# Patient Record
Sex: MALE | Race: OTHER
[De-identification: names, ages, dates, MRNs, and addresses within clinical notes are randomized per-mention and may not be internally consistent; named-entity substitution may affect disease eponyms.]

---

## 2022-10-22 ENCOUNTER — HOSPITAL ENCOUNTER (INPATIENT)
Dept: HOSPITAL 54 - ER | Age: 87
LOS: 4 days | Discharge: NURSING, REHAB OR LONG-TERM CARE | DRG: 280 | End: 2022-10-26
Attending: INTERNAL MEDICINE | Admitting: NURSE PRACTITIONER
Payer: MEDICARE

## 2022-10-22 VITALS — WEIGHT: 129.38 LBS | HEIGHT: 68 IN | BODY MASS INDEX: 19.61 KG/M2

## 2022-10-22 DIAGNOSIS — N17.0: ICD-10-CM

## 2022-10-22 DIAGNOSIS — Z79.01: ICD-10-CM

## 2022-10-22 DIAGNOSIS — E78.5: ICD-10-CM

## 2022-10-22 DIAGNOSIS — I21.A1: ICD-10-CM

## 2022-10-22 DIAGNOSIS — K80.20: ICD-10-CM

## 2022-10-22 DIAGNOSIS — J90: ICD-10-CM

## 2022-10-22 DIAGNOSIS — I50.33: ICD-10-CM

## 2022-10-22 DIAGNOSIS — I11.0: Primary | ICD-10-CM

## 2022-10-22 DIAGNOSIS — I48.91: ICD-10-CM

## 2022-10-22 DIAGNOSIS — Z86.16: ICD-10-CM

## 2022-10-22 DIAGNOSIS — J98.11: ICD-10-CM

## 2022-10-22 DIAGNOSIS — Z95.0: ICD-10-CM

## 2022-10-22 DIAGNOSIS — Z79.02: ICD-10-CM

## 2022-10-22 DIAGNOSIS — Z79.899: ICD-10-CM

## 2022-10-22 DIAGNOSIS — F01.50: ICD-10-CM

## 2022-10-22 DIAGNOSIS — E88.09: ICD-10-CM

## 2022-10-22 DIAGNOSIS — F20.9: ICD-10-CM

## 2022-10-22 DIAGNOSIS — I48.92: ICD-10-CM

## 2022-10-22 DIAGNOSIS — N28.1: ICD-10-CM

## 2022-10-22 DIAGNOSIS — E80.6: ICD-10-CM

## 2022-10-22 LAB
BASOPHILS # BLD AUTO: 0 K/UL (ref 0–0.2)
BASOPHILS NFR BLD AUTO: 0.4 % (ref 0–2)
BILIRUB UR QL STRIP: (no result)
BUN SERPL-MCNC: 33 MG/DL (ref 7–18)
CALCIUM SERPL-MCNC: 8.3 MG/DL (ref 8.5–10.1)
CHLORIDE SERPL-SCNC: 109 MMOL/L (ref 98–107)
CO2 SERPL-SCNC: 30 MMOL/L (ref 21–32)
COLOR UR: (no result)
CREAT SERPL-MCNC: 1.3 MG/DL (ref 0.6–1.3)
EOSINOPHIL NFR BLD AUTO: 0.1 % (ref 0–6)
GLUCOSE SERPL-MCNC: 133 MG/DL (ref 74–106)
GLUCOSE UR STRIP-MCNC: NEGATIVE MG/DL
HCT VFR BLD AUTO: 42 % (ref 39–51)
HGB BLD-MCNC: 12.6 G/DL (ref 13.5–17.5)
LEUKOCYTE ESTERASE UR QL STRIP: NEGATIVE
LYMPHOCYTES NFR BLD AUTO: 1.5 K/UL (ref 0.8–4.8)
LYMPHOCYTES NFR BLD AUTO: 12.9 % (ref 20–44)
MCHC RBC AUTO-ENTMCNC: 30 G/DL (ref 31–36)
MCV RBC AUTO: 91 FL (ref 80–96)
MONOCYTES NFR BLD AUTO: 1.3 K/UL (ref 0.1–1.3)
MONOCYTES NFR BLD AUTO: 11.1 % (ref 2–12)
NEUTROPHILS # BLD AUTO: 9 K/UL (ref 1.8–8.9)
NEUTROPHILS NFR BLD AUTO: 75.5 % (ref 43–81)
NITRITE UR QL STRIP: NEGATIVE
PH UR STRIP: 6 [PH] (ref 5–8)
PLATELET # BLD AUTO: 165 K/UL (ref 150–450)
POTASSIUM SERPL-SCNC: 4.5 MMOL/L (ref 3.5–5.1)
PROT UR QL STRIP: (no result) MG/DL
RBC # BLD AUTO: 4.62 MIL/UL (ref 4.5–6)
SODIUM SERPL-SCNC: 145 MMOL/L (ref 136–145)
UROBILINOGEN UR STRIP-MCNC: >=8 EU/DL
WBC NRBC COR # BLD AUTO: 11.9 K/UL (ref 4.3–11)

## 2022-10-22 PROCEDURE — C9803 HOPD COVID-19 SPEC COLLECT: HCPCS

## 2022-10-22 PROCEDURE — G0378 HOSPITAL OBSERVATION PER HR: HCPCS

## 2022-10-22 NOTE — NUR
-------------------------------------------------------------------------------

           *** Note undone in [facility] - 10/23/22 at 0533 by LASHAWN ***            

-------------------------------------------------------------------------------

-1

## 2022-10-23 VITALS — DIASTOLIC BLOOD PRESSURE: 67 MMHG | SYSTOLIC BLOOD PRESSURE: 131 MMHG

## 2022-10-23 VITALS — DIASTOLIC BLOOD PRESSURE: 72 MMHG | SYSTOLIC BLOOD PRESSURE: 110 MMHG

## 2022-10-23 LAB
ALBUMIN SERPL BCP-MCNC: 2.1 G/DL (ref 3.4–5)
ALP SERPL-CCNC: 134 U/L (ref 46–116)
ALT SERPL W P-5'-P-CCNC: 37 U/L (ref 12–78)
AST SERPL W P-5'-P-CCNC: 61 U/L (ref 15–37)
BASOPHILS # BLD AUTO: 0 K/UL (ref 0–0.2)
BASOPHILS NFR BLD AUTO: 0 % (ref 0–2)
BILIRUB SERPL-MCNC: 6.7 MG/DL (ref 0.2–1)
BUN SERPL-MCNC: 34 MG/DL (ref 7–18)
CALCIUM SERPL-MCNC: 8.1 MG/DL (ref 8.5–10.1)
CHLORIDE SERPL-SCNC: 107 MMOL/L (ref 98–107)
CO2 SERPL-SCNC: 25 MMOL/L (ref 21–32)
CREAT SERPL-MCNC: 1.3 MG/DL (ref 0.6–1.3)
EOSINOPHIL NFR BLD AUTO: 0 % (ref 0–6)
GLUCOSE SERPL-MCNC: 111 MG/DL (ref 74–106)
HCT VFR BLD AUTO: 42 % (ref 39–51)
HGB BLD-MCNC: 13 G/DL (ref 13.5–17.5)
LYMPHOCYTES NFR BLD AUTO: 0.8 K/UL (ref 0.8–4.8)
LYMPHOCYTES NFR BLD AUTO: 10.1 % (ref 20–44)
MCHC RBC AUTO-ENTMCNC: 31 G/DL (ref 31–36)
MCV RBC AUTO: 91 FL (ref 80–96)
MONOCYTES NFR BLD AUTO: 0.4 K/UL (ref 0.1–1.3)
MONOCYTES NFR BLD AUTO: 4.5 % (ref 2–12)
NEUTROPHILS # BLD AUTO: 6.9 K/UL (ref 1.8–8.9)
NEUTROPHILS NFR BLD AUTO: 85.4 % (ref 43–81)
PLATELET # BLD AUTO: 160 K/UL (ref 150–450)
PROT SERPL-MCNC: 7.9 G/DL (ref 6.4–8.2)
RBC # BLD AUTO: 4.65 MIL/UL (ref 4.5–6)
SODIUM SERPL-SCNC: 142 MMOL/L (ref 136–145)
WBC NRBC COR # BLD AUTO: 8.1 K/UL (ref 4.3–11)

## 2022-10-23 RX ADMIN — APIXABAN SCH MG: 5 TABLET, FILM COATED ORAL at 09:58

## 2022-10-23 RX ADMIN — RANOLAZINE SCH MG: 500 TABLET, FILM COATED, EXTENDED RELEASE ORAL at 09:57

## 2022-10-23 RX ADMIN — QUETIAPINE SCH MG: 25 TABLET, FILM COATED ORAL at 11:45

## 2022-10-23 RX ADMIN — Medication SCH MG: at 21:45

## 2022-10-23 RX ADMIN — MEMANTINE HYDROCHLORIDE SCH MG: 5 TABLET ORAL at 09:57

## 2022-10-23 RX ADMIN — RANOLAZINE SCH MG: 500 TABLET, FILM COATED, EXTENDED RELEASE ORAL at 21:45

## 2022-10-23 RX ADMIN — PANTOPRAZOLE SODIUM SCH MG: 40 TABLET, DELAYED RELEASE ORAL at 08:00

## 2022-10-23 RX ADMIN — APIXABAN SCH MG: 5 TABLET, FILM COATED ORAL at 16:37

## 2022-10-23 RX ADMIN — DONEPEZIL HYDROCHLORIDE SCH MG: 5 TABLET ORAL at 21:45

## 2022-10-23 RX ADMIN — CLOPIDOGREL BISULFATE SCH MG: 75 TABLET, FILM COATED ORAL at 09:57

## 2022-10-23 RX ADMIN — MEMANTINE HYDROCHLORIDE SCH MG: 5 TABLET ORAL at 21:46

## 2022-10-23 RX ADMIN — QUETIAPINE SCH MG: 25 TABLET, FILM COATED ORAL at 16:37

## 2022-10-23 RX ADMIN — QUETIAPINE SCH MG: 25 TABLET, FILM COATED ORAL at 22:13

## 2022-10-23 NOTE — NUR
PT IS RESTING COMFORTABLY IN BED W/ EYES OPEN. RR EVEN AND NONLABORED ON NASAL 
CANNULA 3L OF O2. NO SIGNS OF DISTRESS. ON CONTINUED POX AND HEART MONITOR.

## 2022-10-23 NOTE — NUR
PT TRANSFERRED -1 VIA [facility] ACLS PROTOCOL. WARM HANDOFF GIVEN TO 
XIAO BURCH.

## 2022-10-23 NOTE — NUR
RN CLOSING NOTE



PATIENT AWAKE IN BED RESTING. A/O X1, NO PAIN NOTED AT THIS TIME. ON 3L OXYGEN VIA NC, NO 
DISTRESS OR SHORTNESS OF BREATH NOTED. IV ACCESS LAC #20G, RFA #20G INTACT, PATENT AND 
FLUSHING WELL. PATIENT ON EXTERNAL CARDIAC MONITOR WITH CURRENT READING OF A-FLUTTER 
VENTRICULAR PACING AND HR OF 85, NO CARDIAC DISTRESS NOTED. PATIENT WAS TURNED AND 
REPOSITIONED PER PROTOCOL. SCHEDULE MEDICATIONS ADMINISTERED. FALL AND SAFETY MEASURES IN 
PLACE, BED ALARM ON, BED IN LOW AND LOCK POSITION, CALL LIGHT AND TABLE WITHIN EASY REACH, 
SIDE RAILS UP X2. WILL ENDORSE TO NIGHT SHIFT.

## 2022-10-23 NOTE — NUR
RN NOTE



PATIENT WAS TRANSFERRED FROM ER VIA GURNEY WITH NO SIGNS OF DISTRESS NOTED. REPORT RECEIVED 
FROM ER NURSE. PATIENT V/S TAKEN STABLE AND RECORDED. PATIENT WAS ORIENTED TO ROOM SETUP AND 
EDUCATED ON THE USE OF CALL LIGHT. PATIENT AWAKE IN BED RESTING. A/O X1, NO PAIN NOTED AT 
THIS TIME. ON 4L OXYGEN VIA NC, NO DISTRESS OR SHORTNESS OF BREATH NOTED. IV ACCESS LAC 
#20G, RFA #20G INTACT, PATENT AND FLUSHING WELL. PATIENT ON EXTERNAL CARDIAC MONITOR WITH 
CURRENT READING OF A-FLUTTER VENTRICULAR PACING AND HR OF 80, NO CARDIAC DISTRESS NOTED. 
FALL AND SAFETY MEASURES IN PLACE, BED ALARM ON, BED IN LOW AND LOCK POSITION, CALL LIGHT 
AND TABLE WITHIN EASY REACH, SIDE RAILS UP X2. WILL CONTINUE TO MONITOR.

## 2022-10-23 NOTE — NUR
TELE RN OPENING NOTE

RECEIVED PATIENT IN BED; AWAKE, ALERT AND ORIENTED X 1. ON O2 INHALATION @ 3LPM VIA NASAL 
CANNULA; TOLERATING WELL. NOT IN ANY FORM OF RESPIRATORY DISTRESS. ON TELEMETRY MONITORING 
WITH READING OF A FLUTTER, V PACING HR-83. DENIES ANY PAIN OR DISCOMFORT AT THIS TIME. NEEDS 
ANTICIPATED. WITH IV ACCESS ON LEFT ANTECUBITAL 20g: PATENT, INTACT AND SALINE LOCKED. 
SAFETY MEASURES IMPLEMENTED: HEAD OF BED ELEVATED, CALL LIGHT AND TABLE WITHIN REACH, SIDE 
RAILS UP X2, BED IN LOWEST LOCKED POSITION. WILL CONTINUE PLAN OF CARE.

## 2022-10-24 VITALS — DIASTOLIC BLOOD PRESSURE: 68 MMHG | SYSTOLIC BLOOD PRESSURE: 139 MMHG

## 2022-10-24 VITALS — DIASTOLIC BLOOD PRESSURE: 68 MMHG | SYSTOLIC BLOOD PRESSURE: 117 MMHG

## 2022-10-24 VITALS — SYSTOLIC BLOOD PRESSURE: 106 MMHG | DIASTOLIC BLOOD PRESSURE: 72 MMHG

## 2022-10-24 VITALS — DIASTOLIC BLOOD PRESSURE: 63 MMHG | SYSTOLIC BLOOD PRESSURE: 114 MMHG

## 2022-10-24 VITALS — DIASTOLIC BLOOD PRESSURE: 66 MMHG | SYSTOLIC BLOOD PRESSURE: 136 MMHG

## 2022-10-24 LAB
ALBUMIN SERPL BCP-MCNC: 1.8 G/DL (ref 3.4–5)
ALP SERPL-CCNC: 124 U/L (ref 46–116)
ALT SERPL W P-5'-P-CCNC: 32 U/L (ref 12–78)
AST SERPL W P-5'-P-CCNC: 37 U/L (ref 15–37)
BASOPHILS # BLD AUTO: 0 K/UL (ref 0–0.2)
BASOPHILS NFR BLD AUTO: 0.1 % (ref 0–2)
BILIRUB SERPL-MCNC: 4.6 MG/DL (ref 0.2–1)
BUN SERPL-MCNC: 55 MG/DL (ref 7–18)
CALCIUM SERPL-MCNC: 8.2 MG/DL (ref 8.5–10.1)
CHLORIDE SERPL-SCNC: 107 MMOL/L (ref 98–107)
CO2 SERPL-SCNC: 23 MMOL/L (ref 21–32)
CREAT SERPL-MCNC: 1.7 MG/DL (ref 0.6–1.3)
EOSINOPHIL NFR BLD AUTO: 0 % (ref 0–6)
GLUCOSE SERPL-MCNC: 120 MG/DL (ref 74–106)
HCT VFR BLD AUTO: 43 % (ref 39–51)
HGB BLD-MCNC: 12.7 G/DL (ref 13.5–17.5)
LYMPHOCYTES NFR BLD AUTO: 1.8 K/UL (ref 0.8–4.8)
LYMPHOCYTES NFR BLD AUTO: 18.7 % (ref 20–44)
MAGNESIUM SERPL-MCNC: 2.8 MG/DL (ref 1.8–2.4)
MCHC RBC AUTO-ENTMCNC: 29 G/DL (ref 31–36)
MCV RBC AUTO: 95 FL (ref 80–96)
MONOCYTES NFR BLD AUTO: 1.1 K/UL (ref 0.1–1.3)
MONOCYTES NFR BLD AUTO: 11.2 % (ref 2–12)
NEUTROPHILS # BLD AUTO: 6.8 K/UL (ref 1.8–8.9)
NEUTROPHILS NFR BLD AUTO: 70 % (ref 43–81)
PHOSPHATE SERPL-MCNC: 5 MG/DL (ref 2.5–4.9)
PLATELET # BLD AUTO: 145 K/UL (ref 150–450)
POTASSIUM SERPL-SCNC: 5.2 MMOL/L (ref 3.5–5.1)
PROT SERPL-MCNC: 7.4 G/DL (ref 6.4–8.2)
RBC # BLD AUTO: 4.56 MIL/UL (ref 4.5–6)
SODIUM SERPL-SCNC: 143 MMOL/L (ref 136–145)
WBC NRBC COR # BLD AUTO: 9.7 K/UL (ref 4.3–11)

## 2022-10-24 RX ADMIN — Medication SCH MG: at 21:26

## 2022-10-24 RX ADMIN — QUETIAPINE SCH MG: 25 TABLET, FILM COATED ORAL at 08:52

## 2022-10-24 RX ADMIN — APIXABAN SCH MG: 5 TABLET, FILM COATED ORAL at 08:50

## 2022-10-24 RX ADMIN — DONEPEZIL HYDROCHLORIDE SCH MG: 5 TABLET ORAL at 21:26

## 2022-10-24 RX ADMIN — PANTOPRAZOLE SODIUM SCH MG: 40 TABLET, DELAYED RELEASE ORAL at 08:48

## 2022-10-24 RX ADMIN — RANOLAZINE SCH MG: 500 TABLET, FILM COATED, EXTENDED RELEASE ORAL at 21:26

## 2022-10-24 RX ADMIN — QUETIAPINE SCH MG: 25 TABLET, FILM COATED ORAL at 16:34

## 2022-10-24 RX ADMIN — MEMANTINE HYDROCHLORIDE SCH MG: 5 TABLET ORAL at 21:28

## 2022-10-24 RX ADMIN — MEMANTINE HYDROCHLORIDE SCH MG: 5 TABLET ORAL at 08:47

## 2022-10-24 RX ADMIN — CLOPIDOGREL BISULFATE SCH MG: 75 TABLET, FILM COATED ORAL at 08:50

## 2022-10-24 RX ADMIN — RANOLAZINE SCH MG: 500 TABLET, FILM COATED, EXTENDED RELEASE ORAL at 08:49

## 2022-10-24 RX ADMIN — APIXABAN SCH MG: 5 TABLET, FILM COATED ORAL at 16:35

## 2022-10-24 NOTE — NUR
RN NOTES



DR PUENTE VISITED PT AND REPORTED TO HIM THAT PT HAS HIGH NT-PRO-BNP >[id number] AND TROPONIN-I 
94. DR PUENTE STATED ITS OKAY AND NO NEED FOR FURTHER CYCLE CARDIAC ENZYME TESTS.

## 2022-10-24 NOTE — NUR
TELE RN OPENING NOTE



RECEIVED PATIENT AWAKE IN BED. PT A/O X 1-2. VERBALLY RESPONSIVE AND CONFUSED. NO S/S OF 
PAIN OR DISCOMFORT SEEN AT THIS TIME. NO RESPIRATORY DISTRESS NOTED. ON O2 @ 3LPM VIA N/C. 
PT TOLERATING O2 WELL. BREATHING EVEN AND UNLABORED. ON TELEMETRY WITH CURRENT READING OF 
V-PACING, HR-81.  IV ACCESSES TO LAC #20g AND RFA #20 BOTH PATENT, INTACT AND SALINE LOCKED. 
SAFETY MEASURES MAINTAINED: HEAD OF BED ELEVATED, CALL LIGHT AND BED SIDE TABLE WITHIN 
REACH, SIDE RAILS UP X3, BED IN LOWEST LOCKED POSITION. WILL CONTINUE PLAN OF CARE.

## 2022-10-24 NOTE — NUR
313-1

TELE RN CLOSING NOTE

PATIENT IN BED; AWAKE, A/O X 1. ON O2 INHALATION @ 3LPM VIA NASAL CANNULA; TOLERATING WELL. 
IN NO ACUTE DISTRESS NOTED. WITH IV ACCESS ON LEFT AC 20g: PATENT, INTACT AND SALINE LOCKED. 
ON TELE MONITORING WITH READING OF V PACING HR-81 BPM. NO S/S OF PAIN OR DISCOMFORT NOTED AT 
THIS TIME. ALL NEEDS ATTENDED. ALL DUE MEDS GIVEN AS ORDERED. . SAFETY MEASURES MAINTAINED: 
HEAD OF BED ELEVATED, CALL LIGHT AND TABLE WITHIN REACH, SIDE RAILS UP X2, BED IN LOWEST 
LOCKED POSITION. ENDORSED TO MORNING SHIFT FOR NEELA.

## 2022-10-24 NOTE — NUR
RN NOTES



DR KRISHNA ON UNIT AND REPORTED ALL ABNORMAL BLOOD LEVELS LIKE HIGH TROPONIN, BNP, POTASSIUM, 
MG, BUN, CREATININE ETC. HE VERBALIZED THAT HE KNOWS IT AND READ THEM ALREADY AND WILL PUT 
ORDERS.

## 2022-10-24 NOTE — NUR
TELE-RN CLOSING NOTES



PATIENT AWAKE IN BED AND LYING AT MODERATE HIGH BACKREST POSITION. A/O X 1-2. VERBALLY 
RESPONSIVE AND CONFUSED. ON O2 @ 3LPM VIA N/C; TOLERATING WELL, BREATHING EVEN AND 
UNLABORED. ON TELE-MONITOR WITH CURRENT READING OF V-PACING WITH ATRIAL FLUTTER, HR-81, NO 
S/S OF CARDIAC DISTRESS OBSERVED DURING SHIFT. IV ACCESSES ON LAC #20g AND RFA #20 BOTH 
PATENT, INTACT AND SALINE LOCKED. PT TURNED AND REPOSITIONED Q 2HRS AND PRN. ALL NEEDS AND 
CARE PROVIDED WELL. SAFETY MEASURES MAINTAINED: HEAD OF BED KEPT ELEVATED, CALL LIGHT AND 
TRAY TABLE WITHIN REACH, SIDE RAILS UP X3, BED IN LOWEST LOCKED POSITION.WILL ENDORSE PLAN 
OF CARE TO NIGHT SHIFT NURSE.

## 2022-10-24 NOTE — NUR
TELE RN OPENING NOTES



RECEIVED PATIENT AWAKE IN BED IN NO ACUTE SIGNS OF DISTRESS. HOB ELEVATED. A/O X 1-2. 
VERBALLY RESPONSIVE AND CONFUSE, DENIES ANY PAIN OR DISCOMFORT AT THIS TIME. ON O2 @ 3LPM 
VIA N/C; TOLERATING WELL, BREATHING EVEN AND UNLABORED. ON TELEMETRY WITH CURRENT READING OF 
V-PACING, HR-81, NO S/S OF CARDIAC DISTRESS OBSERVED AT THIS TIME.  IV ACCESSES ON LAC #20g 
AND RFA #20 BOTH PATENT, INTACT AND SALINE LOCKED. SAFETY MEASURES MAINTAINED: HEAD OF BED 
KEPT ELEVATED, CALL LIGHT AND TRAY TABLE WITHIN REACH, SIDE RAILS UP X3, BED IN LOWEST 
LOCKED POSITION. WILL CONTINUE PLAN OF CARE.

## 2022-10-25 VITALS — SYSTOLIC BLOOD PRESSURE: 102 MMHG | DIASTOLIC BLOOD PRESSURE: 64 MMHG

## 2022-10-25 VITALS — SYSTOLIC BLOOD PRESSURE: 130 MMHG | DIASTOLIC BLOOD PRESSURE: 73 MMHG

## 2022-10-25 VITALS — SYSTOLIC BLOOD PRESSURE: 110 MMHG | DIASTOLIC BLOOD PRESSURE: 75 MMHG

## 2022-10-25 VITALS — DIASTOLIC BLOOD PRESSURE: 63 MMHG | SYSTOLIC BLOOD PRESSURE: 95 MMHG

## 2022-10-25 VITALS — SYSTOLIC BLOOD PRESSURE: 95 MMHG | DIASTOLIC BLOOD PRESSURE: 63 MMHG

## 2022-10-25 VITALS — SYSTOLIC BLOOD PRESSURE: 125 MMHG | DIASTOLIC BLOOD PRESSURE: 72 MMHG

## 2022-10-25 VITALS — SYSTOLIC BLOOD PRESSURE: 125 MMHG | DIASTOLIC BLOOD PRESSURE: 56 MMHG

## 2022-10-25 LAB
ALBUMIN SERPL BCP-MCNC: 1.8 G/DL (ref 3.4–5)
ALP SERPL-CCNC: 126 U/L (ref 46–116)
ALT SERPL W P-5'-P-CCNC: 28 U/L (ref 12–78)
AST SERPL W P-5'-P-CCNC: 32 U/L (ref 15–37)
BASOPHILS # BLD AUTO: 0 K/UL (ref 0–0.2)
BASOPHILS NFR BLD AUTO: 0 % (ref 0–2)
BILIRUB DIRECT SERPL-MCNC: 3.7 MG/DL (ref 0–0.2)
BILIRUB SERPL-MCNC: 4.7 MG/DL (ref 0.2–1)
BILIRUB SERPL-MCNC: 5.1 MG/DL (ref 0.2–1)
BUN SERPL-MCNC: 67 MG/DL (ref 7–18)
CALCIUM SERPL-MCNC: 7.9 MG/DL (ref 8.5–10.1)
CHLORIDE SERPL-SCNC: 106 MMOL/L (ref 98–107)
CO2 SERPL-SCNC: 28 MMOL/L (ref 21–32)
CREAT SERPL-MCNC: 1.8 MG/DL (ref 0.6–1.3)
EOSINOPHIL NFR BLD AUTO: 0 % (ref 0–6)
GLUCOSE SERPL-MCNC: 103 MG/DL (ref 74–106)
HCT VFR BLD AUTO: 41 % (ref 39–51)
HGB BLD-MCNC: 12.8 G/DL (ref 13.5–17.5)
LYMPHOCYTES NFR BLD AUTO: 1.9 K/UL (ref 0.8–4.8)
LYMPHOCYTES NFR BLD AUTO: 18.9 % (ref 20–44)
MAGNESIUM SERPL-MCNC: 2.7 MG/DL (ref 1.8–2.4)
MCHC RBC AUTO-ENTMCNC: 31 G/DL (ref 31–36)
MCV RBC AUTO: 90 FL (ref 80–96)
MONOCYTES NFR BLD AUTO: 1 K/UL (ref 0.1–1.3)
MONOCYTES NFR BLD AUTO: 9.5 % (ref 2–12)
NEUTROPHILS # BLD AUTO: 7.4 K/UL (ref 1.8–8.9)
NEUTROPHILS NFR BLD AUTO: 71.6 % (ref 43–81)
PHOSPHATE SERPL-MCNC: 4.3 MG/DL (ref 2.5–4.9)
PLATELET # BLD AUTO: 139 K/UL (ref 150–450)
POTASSIUM SERPL-SCNC: 4.6 MMOL/L (ref 3.5–5.1)
PROT SERPL-MCNC: 7 G/DL (ref 6.4–8.2)
RBC # BLD AUTO: 4.58 MIL/UL (ref 4.5–6)
SODIUM SERPL-SCNC: 145 MMOL/L (ref 136–145)
WBC NRBC COR # BLD AUTO: 10.3 K/UL (ref 4.3–11)

## 2022-10-25 RX ADMIN — RANOLAZINE SCH MG: 500 TABLET, FILM COATED, EXTENDED RELEASE ORAL at 21:46

## 2022-10-25 RX ADMIN — DONEPEZIL HYDROCHLORIDE SCH MG: 5 TABLET ORAL at 21:47

## 2022-10-25 RX ADMIN — MEMANTINE HYDROCHLORIDE SCH MG: 5 TABLET ORAL at 09:18

## 2022-10-25 RX ADMIN — Medication SCH MG: at 21:47

## 2022-10-25 RX ADMIN — RANOLAZINE SCH MG: 500 TABLET, FILM COATED, EXTENDED RELEASE ORAL at 09:16

## 2022-10-25 RX ADMIN — Medication SCH OZ: at 10:44

## 2022-10-25 RX ADMIN — APIXABAN SCH MG: 5 TABLET, FILM COATED ORAL at 17:10

## 2022-10-25 RX ADMIN — PANTOPRAZOLE SODIUM SCH MG: 40 TABLET, DELAYED RELEASE ORAL at 09:18

## 2022-10-25 RX ADMIN — MEMANTINE HYDROCHLORIDE SCH MG: 5 TABLET ORAL at 21:46

## 2022-10-25 RX ADMIN — APIXABAN SCH MG: 5 TABLET, FILM COATED ORAL at 09:20

## 2022-10-25 RX ADMIN — QUETIAPINE SCH MG: 25 TABLET, FILM COATED ORAL at 09:18

## 2022-10-25 RX ADMIN — CLOPIDOGREL BISULFATE SCH MG: 75 TABLET, FILM COATED ORAL at 09:19

## 2022-10-25 RX ADMIN — Medication SCH ML: at 17:11

## 2022-10-25 RX ADMIN — QUETIAPINE SCH MG: 25 TABLET, FILM COATED ORAL at 21:46

## 2022-10-25 RX ADMIN — QUETIAPINE SCH MG: 25 TABLET, FILM COATED ORAL at 17:11

## 2022-10-25 NOTE — NUR
TELE RN CLOSING NOTE



LEFT PATIENT SLEEPING IN BED. PT A/O X 1-2. VERBALLY RESPONSIVE AND CONFUSED. ON O2 @ 3LPM 
VIA N/C; TOLERATING WELL, BREATHING EVEN AND UNLABORED. ON TELE-MONITOR WITH CURRENT READING 
OF V-PACING WITH ATRIAL FLUTTER, HR-82, NO S/S OF CARDIAC DISTRESS OBSERVED DURING SHIFT. IV 
ACCESSES ON LAC #20g AND RFA #20 BOTH PATENT, INTACT AND SALINE LOCKED. ALL NEEDS AND CARE 
PROVIDED. SAFETY MEASURES MAINTAINED: HEAD OF BED KEPT ELEVATED, CALL LIGHT AND BED SIDE 
TABLE WITHIN REACH, SIDE RAILS UP X3, BED IN LOWEST LOCKED POSITION. WILL ENDORSE PLAN OF 
CARE TO MORNING SHIFT NURSE.

## 2022-10-25 NOTE — NUR
TELE RN OPENING NOTE



Patient in bed, awake. A/O x 1-2, Korean speaking. On O2 at #LPM via NC, breathing evenly 
and unlabored. No SOB or s/s of distress noted. IV access on LAC #20, and RFA #20 SL, both 
intact and patent. On tele monitoring V-pacing HR 81. Safety precautions in place: bed in 
low, locked position; siderails up x2; call light within reach. Will continue to monitor.

## 2022-10-25 NOTE — NUR
WOUND CARE CONSULT: PT PRESENTS WITH AREAS OF SKIN DISCOLORATION, SACRAL DEEP TISSUE INJURY 
AND LEFT HIP DRY ABRASION, PRESENT ON ADMISSION. RECOMMENDATIONS MADE FOR SKIN PROTECTION. 
DISCUSSED WITH NURSING STAFF. MD IN AGREEMENT WITH PLAN OF CARE.

## 2022-10-25 NOTE — NUR
TELE RN CLOSING NOTE



Patient in bed, resting. Alert, Syriac speaking. On O2 at 3LPM via NC, breathing evenly and 
unlabored. No SOB or s/s of distress noted. IV access on LAC #20, and RFA #20 SL, both 
intact and patent. On tele monitoring V-pacing with BBB and occasional PVC's, HR 80. Patient 
kept clean and dry. Due meds given. Turned and repositioned, as tolerated. Safety 
precautions in place: bed in low, locked position; siderails up x2; call light within reach. 
Will endorse to night shift nurse for NEELA.

## 2022-10-25 NOTE — NUR
RN TELE OPEN NOTE: RESPONSIVE TO VERBAL STIMULI, OPENS EYES. ON 02 3LPM NC. ON TELE MONITOR 
WITH A READING OF V PACING 80'S.  IV'S ON LEFT AC AND RT FOREARM PATENT. HOB ELEVATED WITH 
SEMI-BROOKS'S POSITION. BILATERAL HALF SIDE RAIL UPX2. BED IS LOCKED, IN LOW POSITION WITH 
BED EXIT ALARM ON. CALL LIGHT IN REACH.

## 2022-10-26 VITALS — DIASTOLIC BLOOD PRESSURE: 66 MMHG | SYSTOLIC BLOOD PRESSURE: 100 MMHG

## 2022-10-26 VITALS — DIASTOLIC BLOOD PRESSURE: 51 MMHG | SYSTOLIC BLOOD PRESSURE: 102 MMHG

## 2022-10-26 LAB
ALBUMIN SERPL BCP-MCNC: 1.7 G/DL (ref 3.4–5)
ALP SERPL-CCNC: 121 U/L (ref 46–116)
ALT SERPL W P-5'-P-CCNC: 27 U/L (ref 12–78)
AST SERPL W P-5'-P-CCNC: 37 U/L (ref 15–37)
BASOPHILS # BLD AUTO: 0 K/UL (ref 0–0.2)
BASOPHILS NFR BLD AUTO: 0 % (ref 0–2)
BILIRUB SERPL-MCNC: 4.4 MG/DL (ref 0.2–1)
BUN SERPL-MCNC: 63 MG/DL (ref 7–18)
CALCIUM SERPL-MCNC: 7.9 MG/DL (ref 8.5–10.1)
CHLORIDE SERPL-SCNC: 108 MMOL/L (ref 98–107)
CO2 SERPL-SCNC: 29 MMOL/L (ref 21–32)
CREAT SERPL-MCNC: 1.6 MG/DL (ref 0.6–1.3)
EOSINOPHIL NFR BLD AUTO: 0.4 % (ref 0–6)
GLUCOSE SERPL-MCNC: 152 MG/DL (ref 74–106)
HCT VFR BLD AUTO: 40 % (ref 39–51)
HGB BLD-MCNC: 12.5 G/DL (ref 13.5–17.5)
LYMPHOCYTES NFR BLD AUTO: 1.1 K/UL (ref 0.8–4.8)
LYMPHOCYTES NFR BLD AUTO: 13 % (ref 20–44)
MAGNESIUM SERPL-MCNC: 2.8 MG/DL (ref 1.8–2.4)
MCHC RBC AUTO-ENTMCNC: 31 G/DL (ref 31–36)
MCV RBC AUTO: 91 FL (ref 80–96)
MONOCYTES NFR BLD AUTO: 0.6 K/UL (ref 0.1–1.3)
MONOCYTES NFR BLD AUTO: 7.4 % (ref 2–12)
NEUTROPHILS # BLD AUTO: 6.8 K/UL (ref 1.8–8.9)
NEUTROPHILS NFR BLD AUTO: 79.2 % (ref 43–81)
PHOSPHATE SERPL-MCNC: 2.5 MG/DL (ref 2.5–4.9)
PLATELET # BLD AUTO: 137 K/UL (ref 150–450)
POTASSIUM SERPL-SCNC: 4.3 MMOL/L (ref 3.5–5.1)
PROT SERPL-MCNC: 6.4 G/DL (ref 6.4–8.2)
RBC # BLD AUTO: 4.44 MIL/UL (ref 4.5–6)
SODIUM SERPL-SCNC: 144 MMOL/L (ref 136–145)
WBC NRBC COR # BLD AUTO: 8.5 K/UL (ref 4.3–11)

## 2022-10-26 RX ADMIN — Medication SCH OZ: at 08:36

## 2022-10-26 RX ADMIN — QUETIAPINE SCH MG: 25 TABLET, FILM COATED ORAL at 08:34

## 2022-10-26 RX ADMIN — PANTOPRAZOLE SODIUM SCH MG: 40 TABLET, DELAYED RELEASE ORAL at 08:36

## 2022-10-26 RX ADMIN — APIXABAN SCH MG: 5 TABLET, FILM COATED ORAL at 08:32

## 2022-10-26 RX ADMIN — CLOPIDOGREL BISULFATE SCH MG: 75 TABLET, FILM COATED ORAL at 08:34

## 2022-10-26 RX ADMIN — MEMANTINE HYDROCHLORIDE SCH MG: 5 TABLET ORAL at 08:33

## 2022-10-26 RX ADMIN — Medication SCH ML: at 08:36

## 2022-10-26 RX ADMIN — RANOLAZINE SCH MG: 500 TABLET, FILM COATED, EXTENDED RELEASE ORAL at 08:33

## 2022-10-26 NOTE — NUR
DISCHARGE NOTES



Patient discharged to [facility] in stable condition. Patient is A/O x 0. On O2 at 3 
LPM, breathing evenly and unlabored, no signs of distress. Called and report given to 
XIAO Stoner. Patient has no belongings. IV access removed; pressure dressing applied, no 
signs of bleeding noted. Exit folder handed to EMT. Patient left in stable condition via 
ambulance service with 2 EMT assistance.

## 2022-10-26 NOTE — NUR
RN TELE CLOSING NOTE: RESPONSIVE TO VERBAL STIMULI, OPENS EYES, MAKES UNCOMPREHENDING 
SOUNDS. ON 02 3LPM NC. ON TELE MONITOR WITH A READING OF V PACING 80'S.  IV'S ON RT FOREARM 
PATENT. KEPT CLEAN AND DRY. TURNED AND REPOSITIONED. HOB ELEVATED SEMI-BROOKS'S POSITION. 
BILATERAL HALF SIDE RAIL UPX2. BED IS LOCKED, IN LOW POSITION WITH BED EXIT ALARM ON. CALL 
LIGHT IN REACH.

## 2022-10-26 NOTE — NUR
TELE RN OPENING NOTE



Patient in bed, awake and responsive, A/O x1-2, Wolof speaking. On O2 at 3LPM via NC, 
breathing evenly and unlabored. No SOB or s/s of distress noted. IV access on RFA #20, SL, 
intact and patent. On tele monitoring V-pacing HR 80. Safety precautions in place: bed in 
low, locked position; siderails up x2; call light within reach. Will continue to monitor.